# Patient Record
Sex: MALE | URBAN - METROPOLITAN AREA
[De-identification: names, ages, dates, MRNs, and addresses within clinical notes are randomized per-mention and may not be internally consistent; named-entity substitution may affect disease eponyms.]

---

## 2019-02-12 ENCOUNTER — APPOINTMENT (OUTPATIENT)
Age: 54
Setting detail: DERMATOLOGY
End: 2019-02-16

## 2019-02-12 VITALS — HEIGHT: 66 IN

## 2019-02-12 DIAGNOSIS — L30.9 DERMATITIS, UNSPECIFIED: ICD-10-CM

## 2019-02-12 PROBLEM — L70.0 ACNE VULGARIS: Status: ACTIVE | Noted: 2019-02-12

## 2019-02-12 PROBLEM — J30.1 ALLERGIC RHINITIS DUE TO POLLEN: Status: ACTIVE | Noted: 2019-02-12

## 2019-02-12 PROBLEM — L85.3 XEROSIS CUTIS: Status: ACTIVE | Noted: 2019-02-12

## 2019-02-12 PROCEDURE — OTHER MIPS QUALITY: OTHER

## 2019-02-12 PROCEDURE — 99203 OFFICE O/P NEW LOW 30 MIN: CPT

## 2019-02-12 PROCEDURE — OTHER COUNSELING: OTHER

## 2019-02-12 PROCEDURE — OTHER FOLLOW UP FOR NEXT VISIT: OTHER

## 2019-02-12 PROCEDURE — OTHER TREATMENT REGIMEN: OTHER

## 2019-02-12 PROCEDURE — OTHER PRESCRIPTION: OTHER

## 2019-02-12 RX ORDER — PREDNISONE 10 MG/1
10MG TABLET ORAL QAM
Qty: 48 | Refills: 0 | Status: ERX | COMMUNITY
Start: 2019-02-12

## 2019-02-12 ASSESSMENT — LOCATION SIMPLE DESCRIPTION DERM
LOCATION SIMPLE: RIGHT FOREARM
LOCATION SIMPLE: LEFT FOREARM
LOCATION SIMPLE: RIGHT UPPER BACK
LOCATION SIMPLE: RIGHT LOWER BACK

## 2019-02-12 ASSESSMENT — LOCATION DETAILED DESCRIPTION DERM
LOCATION DETAILED: LEFT PROXIMAL DORSAL FOREARM
LOCATION DETAILED: RIGHT PROXIMAL DORSAL FOREARM
LOCATION DETAILED: RIGHT VENTRAL PROXIMAL FOREARM
LOCATION DETAILED: LEFT VENTRAL PROXIMAL FOREARM
LOCATION DETAILED: RIGHT SUPERIOR MEDIAL UPPER BACK
LOCATION DETAILED: RIGHT SUPERIOR MEDIAL MIDBACK

## 2019-02-12 ASSESSMENT — BSA RASH: BSA RASH: 5

## 2019-02-12 ASSESSMENT — PAIN INTENSITY VAS: HOW INTENSE IS YOUR PAIN 0 BEING NO PAIN, 10 BEING THE MOST SEVERE PAIN POSSIBLE?: NO PAIN

## 2019-02-12 ASSESSMENT — LOCATION ZONE DERM
LOCATION ZONE: TRUNK
LOCATION ZONE: ARM

## 2019-02-12 ASSESSMENT — SEVERITY ASSESSMENT: SEVERITY: MILD TO MODERATE

## 2019-02-12 NOTE — HPI: RASH
What Type Of Note Output Would You Prefer (Optional)?: Bullet Format
How Severe Is Your Rash?: mild
Is This A New Presentation, Or A Follow-Up?: Rash
Additional History: Patient with scattered patches of dry, scaly, itchy skin for the past three months. No particular pattern, no specific alleviating or aggravating factors. No new medications, he has not tried any specific topical medications at this point. No other persons in his life are affected

## 2019-02-12 NOTE — PROCEDURE: TREATMENT REGIMEN
Detail Level: Simple
Initiate Treatment: Prednisone 10mg taper
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 Hopefully the oral prednisone will lead to total resolution. If not patient will call or follow up for biopsy. If the rash reoccurs we may need to consider changing things like his detergent or fabric softener and may require follow-up or referral to an allergist

## 2019-02-12 NOTE — PROCEDURE: FOLLOW UP FOR NEXT VISIT
Scheduled For Follow Up In (Optional): 2 weeks
Detail Level: Detailed
Instructions (Optional): If symptoms worsen or do not improve

## 2019-04-01 ENCOUNTER — APPOINTMENT (OUTPATIENT)
Age: 54
Setting detail: DERMATOLOGY
End: 2019-04-01

## 2019-04-01 VITALS — HEIGHT: 66 IN

## 2019-04-01 DIAGNOSIS — L30.0 NUMMULAR DERMATITIS: ICD-10-CM

## 2019-04-01 PROCEDURE — OTHER PRESCRIPTION: OTHER

## 2019-04-01 PROCEDURE — OTHER TREATMENT REGIMEN: OTHER

## 2019-04-01 PROCEDURE — OTHER MIPS QUALITY: OTHER

## 2019-04-01 PROCEDURE — OTHER COUNSELING: OTHER

## 2019-04-01 PROCEDURE — OTHER FOLLOW UP FOR NEXT VISIT: OTHER

## 2019-04-01 PROCEDURE — 99213 OFFICE O/P EST LOW 20 MIN: CPT

## 2019-04-01 RX ORDER — TRIAMCINOLONE ACETONIDE 1 MG/G
APPLY THIN COAT CREAM TOPICAL BID
Qty: 1 | Refills: 1 | Status: ERX | COMMUNITY
Start: 2019-04-01

## 2019-04-01 ASSESSMENT — LOCATION SIMPLE DESCRIPTION DERM
LOCATION SIMPLE: LEFT LOWER BACK
LOCATION SIMPLE: LEFT THIGH

## 2019-04-01 ASSESSMENT — BSA RASH: BSA RASH: 2

## 2019-04-01 ASSESSMENT — LOCATION DETAILED DESCRIPTION DERM
LOCATION DETAILED: LEFT INFERIOR MEDIAL MIDBACK
LOCATION DETAILED: LEFT ANTERIOR PROXIMAL THIGH

## 2019-04-01 ASSESSMENT — LOCATION ZONE DERM
LOCATION ZONE: LEG
LOCATION ZONE: TRUNK

## 2019-04-01 ASSESSMENT — SEVERITY ASSESSMENT: SEVERITY: MILD

## 2019-04-01 NOTE — PROCEDURE: TREATMENT REGIMEN
Detail Level: Zone
Initiate Treatment: Triamcinolone 0.1% cream
Plan: Overall better, hopefully the topical steroid periodically will manage this issue. If he notices he needs the medication more and more patient will call or follow up for further treatment recommendations. Otherwise follow up as needed

## 2019-04-01 NOTE — PROCEDURE: FOLLOW UP FOR NEXT VISIT
Detail Level: Simple
Scheduled For Follow Up In (Optional): 1 month
Instructions (Optional): If symptoms worsen or do not improve